# Patient Record
(demographics unavailable — no encounter records)

---

## 2024-11-22 NOTE — HISTORY OF PRESENT ILLNESS
[___ Weeks Post Op] : [unfilled] weeks post op [0] : no pain reported [Chills] : no chills [Constipation] : no constipation [Diarrhea] : no diarrhea [Dysuria] : no dysuria [Fever] : no fever [Nausea] : no nausea [Vomiting] : no vomiting [Doing Well] : is doing well [No Sign of Infection] : is showing no signs of infection [Adequate Pain Control] : has adequate pain control [de-identified] : POS: Right total hip arthroplasty. DOS: 10/31/2024 [de-identified] : She is walking without walking assistive devices she is happy with pain relief.  She is taking Tylenol as needed she is on aspirin for DVT prophylaxis she began outpatient physical therapy.  [de-identified] :  right hip  exam shows healing incision with no sign of infection, ROM is not painless The surgical incision site(s) swollen, but clean, dry and intact, healed, not erythematous and not dehisced. Additional findings included an unremarkable neurological exam, peripheral vascular exam normal and maneuvers demonstrated a negative Joaquim's sign.   [de-identified] :  3V xray of the right hip done in the office today and reviewed and demonstrates s/p implants in good positioning with no evidence of wear, loosening, or subsidence.   [de-identified] : Patient will continue out pt physical therapy and low impact exercise. continue elevated, ice, and pain management.   Continue DVTP medication.  continue posterior hip precaution.  follow-up in  6 weeks

## 2024-12-12 NOTE — HISTORY OF PRESENT ILLNESS
[Chills] : no chills [Constipation] : no constipation [Diarrhea] : no diarrhea [Dysuria] : no dysuria [Fever] : no fever [Nausea] : no nausea [Vomiting] : no vomiting [de-identified] : POS: Right total hip arthroplasty. DOS: 10/31/2024.   [de-identified] : Patient comes in today for wound check.  She reports she is doing well overall and has no pain in the right hip at this time.  She did notice a small area of redness at the distal incision that she comes in today to check on. [de-identified] : Right hip exam well-healed incision.  Small suture abscess at the distal incision.  No active drainage. [de-identified] : AP pelvis shows well-fixed right total hip arthroplasty no subsidence loosening or wear [de-identified] : Postoperatively, the patient is doing well, has excellent pain control and is showing no signs of infection. [de-identified] : Patient is postop right total hip arthroplasty.  Overall doing well.  Have sent Keflex for suture abscess.  Patient will call if any worsening symptoms such as fevers chills nausea vomiting or drainage from wound.

## 2025-01-13 NOTE — END OF VISIT
[FreeTextEntry3] : Documented by Danny Garcia acting solely as a scribe for Dr. Chidi Gurrola on this date 01/13/2025.   All Medical record entries made by the Scribe were at my, Dr. Chidi Gurrola's, direction and personally dictated by me on 01/13/2025. I have reviewed the chart and agree that the record accurately reflects my personal performance of the history, physical exam, assessment and plan. I have also personally directed, reviewed, and agreed with the discharge instructions.

## 2025-01-13 NOTE — HISTORY OF PRESENT ILLNESS
[___ Weeks Post Op] : [unfilled] weeks post op [1] : the patient reports pain that is 1/10 in severity [Chills] : no chills [Constipation] : no constipation [Diarrhea] : no diarrhea [Dysuria] : no dysuria [Fever] : no fever [Nausea] : no nausea [Vomiting] : no vomiting [Xray (Date:___)] : [unfilled] Xray -  [de-identified] : POS: Right total hip arthroplasty. DOS: 10/31/2024 [de-identified] : 63 y/o F is 10 weeks postop right total hip replacement.  She reports she is doing very well.  She is made good progress in outpatient physical therapy.  She is ambulating without assistive devices. She notes mild pain in the groin when navigating stairs, otherwise her pain is well controlled.  [de-identified] : Right hip well-healed incision no sign of infection [de-identified] : 3V xray of the right hip done in office today and reviewed by Dr. Chidi Gurrola demonstrates s/p right hip implants in good positioning with no evidence of wear, loosening, or subsidence. [de-identified] : Postoperatively, the patient is doing well, has excellent pain control and is showing no signs of infection. [de-identified] : Patient may discontinue physical therapy and continue low impact exercise. Patient will continue to adhere to the posterior hip precautions. I discussed use of antibiotic before dental work for 2 years. follow up one year if she continues to do well

## 2025-01-15 NOTE — HISTORY OF PRESENT ILLNESS
[FreeTextEntry1] : 62F h/o anxiety, former chronic smoker, COPD/mild emphysema, pulmonary nodules, HTN, CAD/stent (mRCA 70-95%) in 11/2022 remains on DAPT with prasugrel, recurrence of exertional dyspnea/fatigue, presents for cardiology evaluation seen on 8/2023, repeat Echo LV EF 64% with moderate LA enlargement, exercise nuclear stress no ischemia/infarct, prior visit 3/2024 discontinued ASA 81mg, last seen 10/2024 prior to R-hip replacement, returns for follow up.  Reports still with chronic exertional dyspnea with just walking 1 flight of stairs, had negative pulmonary workup previously including CT and PFT, recovering well for 3 months since the hip surgery, denies chest discomfort, adherent with prasugrel use, stopped smoking >1yr ago. Has early awakening from sleep wants to try Tylenol PM, has not used melatonin in the past, PCP gave her Xanax as needed, taken off Lexapro for few years in the past for QT issue? she wants to try alternative anxiolytic to not rely on Xanax use.     Prior visit 10/2024:  Today pt here for preoperative cardiac risk stratification for upcoming right hip replacement 10/31/2024 at [facility] with Dr. Gurrola. She states she is feeling well overall except for hip pain. Denies chest pain, SOB at rest, edema, palpitations, lightheadedness, near syncope or syncope. Has chronic mild exertional dyspnea, states unchanged. Follows with pulmonology, has appt next week.  Reports compliance with her medications. Has limited activity due to hip pain, walks dog without chest pain or SOB. Quit smoking in 12/2023. Vapes (non-nicotine) occasionally. States she will be getting an iron infusion tomorrow. Denies bleeding, last colonoscopy couple years ago per pt.    Prior visit 3/12/2024: She reports unchanged exertional dyspnea saw a different pulmonologist recently in [place] told also her lung condition is stable, still goes to the gym fast walking for up to 4 miles, she stopped smoking on her own after hypnotism, did not take the bupropion as didn't like the side effect. Denies bleeding on prasugrel and pending upcoming dental work for molar tooth removal. She's planning on retiring this year.   Prior visit 11/2023:  Reports feeling well, has unchanged chronic shortness of breath, walked about 2-3 miles without chest pain, has chronic fatigue, still smoking about 1 pack daily, tried nicotine gum without using the patch, intolerant to Chantix in the past, remains on DAPT, but reports easy bruising and concern about bleeding, pending GYN eval and dental work.    Prior visit 8/2023: Reports prior to her stent placement was having intermittent chest pain and pressure, [place] [facility] [place] with [place] dyspnea/chest discomfort, then ultimately had diagnostic left heart cath found with obstructive CAD 90% in RCA. She recently had recurrence of exertional fatigue/shortness of breath started for about 2-3 months. Had blood work done by her PCP with recent LDL 98 remains only on atorvastatin 10mg, normal TSH/Lyme titer. Used to be walking about 4 miles per day until meniscus injury but not been doing for about 1 month. Had intermittent LE swellings when she was on cruise last year.   Smoking 1 pack daily >45 yrs, tried nicotine patch, Chantix causing heart racing, not been Wellbutrin (due to Lexapro and QT?), tried hypnosis in the past.  Social alcohol Mother with stroke, cardiac arrest age 77

## 2025-01-15 NOTE — CARDIOLOGY SUMMARY
[de-identified] : 8/14/23- Sinus 55, normal axis, no ST changes, QTc 456 11/27/23- Sinus 60, normal axis, no ST changes, QTc 460 3/12/24- Sinus 51, normal axis, no ST changes, QTc 460 1/15/25- Sinus 57, nomral axis, no ST changes, QTc 426 [de-identified] : 10/3/23-  Normal exercise myocardial perfusion scan, with no evidence of infarction or inducible ischemia. No TID (ratio 1.03). 2. The left ventricle is normal in function. The left ventricular EF% during stress is 74 %. 3. No chest pain or ischemic ECG abnormality during exercise. 4. The Duke Treadmill Score is 12.00, which is consistent with low risk (=5) for future cardiac events. [de-identified] : 8/15/23 LV EF 64%, moderate LA, PASP 29  [de-identified] : outside cath 11/21/22- mRCA 70-95% long lesion, mild luminal mLAD

## 2025-01-15 NOTE — DISCUSSION/SUMMARY
[FreeTextEntry1] : 62F h/o anxiety, former chronic smoker, COPD/mild emphysema, pulmonary nodules, HTN, CAD/stent (mRCA 70-95%) in 11/2022 remains on DAPT with prasugrel, recurrence of exertional dyspnea/fatigue, presents for cardiology evaluation seen on 8/2023, repeat Echo LV EF 64% with moderate LA enlargement, exercise nuclear stress no ischemia/infarct, prior visit 3/2024 discontinued ASA 81mg, last seen 10/2024 prior to R-hip replacement, returns for follow up.  Chronic exertional dyspnea still unresolved despite normal stress test and no significant COPD, will repeat Echocardiogram if there is drop in LV EF or regional wall motion abnormalities then refer left heart catherization. EKG with normal QT interval, trial of sertraline for anxiety.     1. Chronic exertional dyspnea, unchanged, await repeat TTE   2. CAD s/p prior stent, last PCI in 11/2022 - On prasugrel and atorvastatin 40mg for LDL goal <70  3. HTN -controlled on nebivolol, nifedipine and valsartan, will discontinue low dose HCTZ 12.5mg as SBP 110s  4. Mild COPD -follow up with pulmonary  5. Former smoker -continue cessation.   6. Anxiety -trial of sertraline 25mg and uptitrate as tolerated   Follow up in 4 months [EKG obtained to assist in diagnosis and management of assessed problem(s)] : EKG obtained to assist in diagnosis and management of assessed problem(s)

## 2025-01-15 NOTE — CARDIOLOGY SUMMARY
[de-identified] : 8/14/23- Sinus 55, normal axis, no ST changes, QTc 456 11/27/23- Sinus 60, normal axis, no ST changes, QTc 460 3/12/24- Sinus 51, normal axis, no ST changes, QTc 460 1/15/25- Sinus 57, nomral axis, no ST changes, QTc 426 [de-identified] : 10/3/23-  Normal exercise myocardial perfusion scan, with no evidence of infarction or inducible ischemia. No TID (ratio 1.03). 2. The left ventricle is normal in function. The left ventricular EF% during stress is 74 %. 3. No chest pain or ischemic ECG abnormality during exercise. 4. The Duke Treadmill Score is 12.00, which is consistent with low risk (=5) for future cardiac events. [de-identified] : 8/15/23 LV EF 64%, moderate LA, PASP 29  [de-identified] : outside cath 11/21/22- mRCA 70-95% long lesion, mild luminal mLAD

## 2025-01-15 NOTE — HISTORY OF PRESENT ILLNESS
[FreeTextEntry1] : 62F h/o anxiety, former chronic smoker, COPD/mild emphysema, pulmonary nodules, HTN, CAD/stent (mRCA 70-95%) in 11/2022 remains on DAPT with prasugrel, recurrence of exertional dyspnea/fatigue, presents for cardiology evaluation seen on 8/2023, repeat Echo LV EF 64% with moderate LA enlargement, exercise nuclear stress no ischemia/infarct, prior visit 3/2024 discontinued ASA 81mg, last seen 10/2024 prior to R-hip replacement, returns for follow up.  Reports still with chronic exertional dyspnea with just walking 1 flight of stairs, had negative pulmonary workup previously including CT and PFT, recovering well for 3 months since the hip surgery, denies chest discomfort, adherent with prasugrel use, stopped smoking >1yr ago. Has early awakening from sleep wants to try Tylenol PM, has not used melatonin in the past, PCP gave her Xanax as needed, taken off Lexapro for few years in the past for QT issue? she wants to try alternative anxiolytic to not rely on Xanax use.     Prior visit 10/2024:  Today pt here for preoperative cardiac risk stratification for upcoming right hip replacement 10/31/2024 at [facility] with Dr. Gurrola. She states she is feeling well overall except for hip pain. Denies chest pain, SOB at rest, edema, palpitations, lightheadedness, near syncope or syncope. Has chronic mild exertional dyspnea, states unchanged. Follows with pulmonology, has appt next week.  Reports compliance with her medications. Has limited activity due to hip pain, walks dog without chest pain or SOB. Quit smoking in 12/2023. Vapes (non-nicotine) occasionally. States she will be getting an iron infusion tomorrow. Denies bleeding, last colonoscopy couple years ago per pt.    Prior visit 3/12/2024: She reports unchanged exertional dyspnea saw a different pulmonologist recently in [place] told also her lung condition is stable, still goes to the gym fast walking for up to 4 miles, she stopped smoking on her own after hypnotism, did not take the bupropion as didn't like the side effect. Denies bleeding on prasugrel and pending upcoming dental work for molar tooth removal. She's planning on retiring this year.   Prior visit 11/2023:  Reports feeling well, has unchanged chronic shortness of breath, walked about 2-3 miles without chest pain, has chronic fatigue, still smoking about 1 pack daily, tried nicotine gum without using the patch, intolerant to Chantix in the past, remains on DAPT, but reports easy bruising and concern about bleeding, pending GYN eval and dental work.    Prior visit 8/2023: Reports prior to her stent placement was having intermittent chest pain and pressure, [place] [facility] [place] with persistent dyspnea/chest discomfort, then ultimately had diagnostic left heart cath found with obstructive CAD 90% in RCA. She recently had recurrence of exertional fatigue/shortness of breath started for about 2-3 months. Had blood work done by her PCP with recent LDL 98 remains only on atorvastatin 10mg, normal TSH/Lyme titer. Used to be walking about 4 miles per day until meniscus injury but not been doing for about 1 month. Had intermittent LE swellings when she was on cruise last year.   Smoking 1 pack daily >45 yrs, tried nicotine patch, Chantix causing heart racing, not been Wellbutrin (due to Lexapro and QT?), tried hypnosis in the past.  Social alcohol Mother with stroke, cardiac arrest age 77

## 2025-05-23 NOTE — HISTORY OF PRESENT ILLNESS
[de-identified] : Ms. SPIKE PEÑA is a 63-year-old female presenting for pancreatic cyst found incidentally on ortho workup s/p hip replacement. Her PMHx is significant for small hepatic and kidney cyst, small left adrenal carcinoma, colonic diverticulosis; PSHx of hernia repair with mesh 15 years ago. FMHx is significant for brain tumor (father; passed age 37) and colon cancer (maternal great aunt, maternal aunt). History of smoking. No personal history of pancreatitis, DM, or weight loss. Last EGD/colonoscopy performed for low iron with GI Dr. Pradhan (no reports yet) - prescribed PPI at that time which he is not taking.  On 1/10/2025 chest CT for lung cancer screening showed stable sub-6 mm lung nodules.  Abdominal ultrasound 05/01/2025 performed for gallbladder polyp demonstrated a 1.6 x 1.2 x 1.2 cm pancreatic tail hypoechoic lesion. 05/09/2025 MRI abdomen demonstrated tiny scattered 2-3 mm cystic foci scattered along pancreas, probably sequela of prior pancreatitis and/or side branch IPMN. No PD dilatation.  5/23/25: Patient presents for initial consultation. feeling well, denies complaints. PCP increased her Zoloft recently.  no longer taking  diuretic.

## 2025-05-23 NOTE — ASSESSMENT
[FreeTextEntry1] : Ms. SPIKE PEÑA is a 63-year-old female presenting for pancreatic cyst found incidentally on ortho workup s/p hip replacement. Her PMHx is significant for small hepatic and kidney cyst, small left adrenal carcinoma, colonic diverticulosis; PSHx of hernia repair with mesh 15 years ago. FMHx is significant for brain tumor (father; passed age 37) and colon cancer (maternal great aunt, maternal aunt). History of smoking. No personal history of pancreatitis, DM, or weight loss.  05/09/2025 MRI abdomen demonstrated tiny scattered 2-3 mm cystic foci scattered along pancreas, probably sequela of prior pancreatitis and/or side branch IPMN. No PD dilatation.  Patient presents for initial consultation. Upon review of the imaging, I visualize the small pancreatic cysts on imaging dating back to March 2024.  I discussed that pancreatic cysts can have a range of pathologies, including benign vs malignant. I discussed that the recommendations for resection include: 1. Symptomatic polyps (65%) 2. Polyps with main duct involvement (65%) 3. Polyps in the side branch with a solid component (35%) Due to her lack of history of pancreatitis, location of cyst, lack of obviously irregular wall or solid component, and small size (<3cm), I advised that surgical removal or further evaluation with EUS is not necessary at this time. Discussed that size is the weakest predictor of malignant potential. I discussed that she will still require ongoing surveillance given she is at a slightly higher risk for developing a PDAC as compared to the general population. We will plano have her follow up in cyst clinic with repeat MRI in 1 year. Patient was encouraged to contact the office in the interim for any concerns or questions or for new onset of DM, jaundice or bout of pancreatitis.   Today, I personally spent 45 minutes in total time including reviewing imaging and studies, discussing complex treatment regimens, direct face to face time with the patient, patient education and counseling.

## 2025-05-23 NOTE — END OF VISIT
[FreeTextEntry3] : By signing my name below, I, Christofer Dee, attest that this documentation has been prepared under the direction and in the presence of Kedar Loving MD in the following sections HISTORY OF PRESENT ILLNESS; PAST MEDICAL/FAMILY/SOCIAL HISTORY; REVIEW OF SYSTEMS; PHYSICAL EXAM; ASSESSMENT/PLAN.